# Patient Record
(demographics unavailable — no encounter records)

---

## 2025-03-20 NOTE — HISTORY OF PRESENT ILLNESS
[FreeTextEntry1] : 66 yo F presents for initial evaluation.  Referred by: Dr. Brittany Hill Referral Reason: "rectal prolapse"  PMH: HLD, migraines PSH: ovarian cystectomy FH:  denies CRC/ IBD Medications: propranolol (migraine prevention), rosuvastatin, Mg07 Allergies: NKDA  Social history: denies  OBHx: ,  x2, episiotomy x1 and tear x1  Outside records provided and reviewed:  Last Colonoscopy:  with Dr. Cotter  - no findings as per patient  2024: XR ABD Sitzmarker: -numerous sitzmarks seen over the course of the right, left, and sigmoid colon -non obstructive bowel gas pattern  2024 XR ABD sitzmarker: non-obstructive bowel gas pattern. sitzmarks day 5 study. -moderate fecal load in the colon  2024 MR Defecography: -Anterior Compartment: no anterior compartment prolapse. mild urethral hypermobility on defecation. -Middle Compartment: no middle compartment prolapse. -Posterior Compartment: low lying anal rectal junction at rest with moderate prolapse on defecation. moderate rectocele. -Other: there is less than expected narrowing of the anorectal angle with kegal maneuver and paradoxical contraction with defecation.   Patient presents for initial evaluation. She has a long history of constipation (> 15 years) which she originally managed with herbal supplements and PRN laxatives. Last year her constipation worsened significantly, and she trialed Linzess without success.  Her PCP then referred her to [facility] Dr. Fried. She states Dr Fried told her she has a "pocket" in her rectum that is pushing foward when she did her exam. She underwent MR Defecography and Sitz marker study showing moderate posterior compartment rectal prolapse, moderate rectocele She was unable to follow up with Dr. Fried due to insurance reasons which is why she is seeking to establish care elsewhere.  As of current, she is taking Mag07 and an herbal laxative supplement daily to aid in bowel movements. She now has completely loose stool to minimize straining. She does admit to feeling bulging anteriorly towards vagina with bowel movements only, she braces her perineum externally to assist with BM. Denies digitalizing rectum or vagina to assist with BM. Denies feeling tissue prolapse from anal area externally. She has not yet tried pelvic floor PT or or interventions. Denies rectal pain, bleeding.  Denies straining, episodes of incontinence, urinary sx   BH: multiple loose stools per day, laxative and Mag07 daily  NO AC/ NSAIDS use

## 2025-03-20 NOTE — ASSESSMENT
[FreeTextEntry1] : Exam findings and diagnosis were discussed at length with patient. Continue bowel regimen. Avoid constipation, diarrhea, pushing or straining. Recommend pelvic floor physical therapy and biofeedback. Referral provided. F/u if symptoms worsen or persist; also recommend urogyn consultation All questions answered.

## 2025-03-20 NOTE — PHYSICAL EXAM
[FreeTextEntry1] : Medical assistant present for duration of physical examination   General no acute distress, alert and oriented Psych calm, pleasant demeanor, responding appropriately to questions Nonlabored breathing Ambulating without assistance Skin normal color and pigment, no visible lesions or rashes    Anorectal Exam: Inspection no erythema, induration or fluctuance, no skin excoriation, no fissure, external hemorrhoids  CEE nontender, no masses palpated, no blood on gloved finger, good tone at rest, weak squeeze, (+) rectocele with palpation of rectocele patient confirms this is similar to sensation she feels during BMs   Procedure: Anoscopy   Pre procedure Diagnosis: rectocele Post procedure Diagnosis: rectocele Anesthesia: none Estimated blood loss: none Specimen: none Complications: none   Consent obtained. Anoscopy was performed by passing a lighted anoscope with lubricant jelly into the anal canal and the entire anal mucosal surface was inspected. Findings included no fissure, mild internal hemorrhoids, no visible masses or lesions in anal canal   Patient tolerated examination and procedure well.